# Patient Record
Sex: FEMALE | Race: WHITE | NOT HISPANIC OR LATINO
[De-identification: names, ages, dates, MRNs, and addresses within clinical notes are randomized per-mention and may not be internally consistent; named-entity substitution may affect disease eponyms.]

---

## 2018-08-01 ENCOUNTER — APPOINTMENT (OUTPATIENT)
Dept: PLASTIC SURGERY | Facility: CLINIC | Age: 58
End: 2018-08-01
Payer: SELF-PAY

## 2018-08-01 PROBLEM — Z00.00 ENCOUNTER FOR PREVENTIVE HEALTH EXAMINATION: Status: ACTIVE | Noted: 2018-08-01

## 2018-08-16 ENCOUNTER — APPOINTMENT (OUTPATIENT)
Dept: PLASTIC SURGERY | Facility: CLINIC | Age: 58
End: 2018-08-16
Payer: SELF-PAY

## 2018-08-16 PROCEDURE — 99024 POSTOP FOLLOW-UP VISIT: CPT

## 2019-02-27 ENCOUNTER — APPOINTMENT (OUTPATIENT)
Dept: PLASTIC SURGERY | Facility: CLINIC | Age: 59
End: 2019-02-27
Payer: COMMERCIAL

## 2019-02-27 VITALS
HEIGHT: 64 IN | OXYGEN SATURATION: 99 % | RESPIRATION RATE: 20 BRPM | HEART RATE: 78 BPM | DIASTOLIC BLOOD PRESSURE: 72 MMHG | BODY MASS INDEX: 31.24 KG/M2 | SYSTOLIC BLOOD PRESSURE: 118 MMHG | TEMPERATURE: 98.8 F | WEIGHT: 183 LBS

## 2019-02-27 PROCEDURE — 11422 EXC H-F-NK-SP B9+MARG 1.1-2: CPT

## 2019-02-27 PROCEDURE — 12041 INTMD RPR N-HF/GENIT 2.5CM/<: CPT

## 2019-02-27 NOTE — PROCEDURE
[FreeTextEntry1] : Pigmented lesion left arm  [FreeTextEntry2] : Excisional biopsy left arm 1.4 cm lesion with intermediate closure 2.1 cm wound  [FreeTextEntry3] : Xylocaine 1% with epinephrine  [FreeTextEntry4] : 20 cc  [FreeTextEntry5] : none  [FreeTextEntry6] : The 1.6 cm left arm lesion was marked for excision with the patient in agreement.  Following a sterile prep and drape, Xylocaine with epinephrine was injected for local anesthesia.  Incision was made as marked and the lesion excised.  Hemostasis was assured and flaps advanced and inset with Monocryl and nylon sutures and sterile dressing placed.  Patient tolerated well.   [FreeTextEntry7] : Pigmented lesion left arm

## 2019-02-27 NOTE — ASSESSMENT
[FreeTextEntry1] : A:\par Pigmented lesion left arm \par P:\par Excision of left arm lesion creating 2.1 cm defect\par Intermediate closure of 2.1 cm wound left arm\par Well tolerated \par Instructions reviewed

## 2019-02-27 NOTE — REASON FOR VISIT
[Procedure: _________] : a [unfilled] procedure visit [FreeTextEntry1] : Patient is referred back by her (new) dermatologist, Dr. Altman, for excisional biopsy of a pigmented lesion on the left arm

## 2019-03-06 ENCOUNTER — APPOINTMENT (OUTPATIENT)
Dept: PLASTIC SURGERY | Facility: CLINIC | Age: 59
End: 2019-03-06
Payer: COMMERCIAL

## 2019-03-06 PROCEDURE — 12051 INTMD RPR FACE/MM 2.5 CM/<: CPT | Mod: 78

## 2019-03-06 PROCEDURE — 99024 POSTOP FOLLOW-UP VISIT: CPT

## 2019-03-06 NOTE — REASON FOR VISIT
[Post Op: _________] : a [unfilled] post op visit [FreeTextEntry1] : Patient returns for suture removal, generally doing well with no complaints and no fevers or chills at home.

## 2019-03-06 NOTE — PROCEDURE
[FreeTextEntry1] : Open wound left arm (dehiscence following suture removal) [FreeTextEntry2] : Repair left shoulder wound  [FreeTextEntry3] : Xylocaine 1% with epinephrine  [FreeTextEntry4] : 10 cc [FreeTextEntry5] : none  [FreeTextEntry6] : Following a sterile prep and drape, Xylocaine with epinephrine was injected for local anesthesia.  The wound was washed and then re-approximated with interrupted nylon sutures.  The patient tolerated well . [FreeTextEntry7] : none

## 2019-03-06 NOTE — PHYSICAL EXAM
[de-identified] : Wound clean and well approximated.\par However, after sutures removed wound opened. \par Clean with no surrounding erythema or purulence

## 2019-03-06 NOTE — ASSESSMENT
[FreeTextEntry1] : A:\par Wound dehiscence following suture removal left shoulder\par P:\par Re-approximated \par pathology shows dermatofibroma

## 2019-03-18 ENCOUNTER — APPOINTMENT (OUTPATIENT)
Dept: PLASTIC SURGERY | Facility: CLINIC | Age: 59
End: 2019-03-18
Payer: SELF-PAY

## 2019-03-18 DIAGNOSIS — D48.5 NEOPLASM OF UNCERTAIN BEHAVIOR OF SKIN: ICD-10-CM

## 2019-03-18 PROCEDURE — 15789 CHEMICAL PEEL FACIAL DERMAL: CPT | Mod: 79

## 2019-03-18 NOTE — PHYSICAL EXAM
[de-identified] : wound is clean and well approximated with no erythema or purulence \par There is no palpable collection \par

## 2019-08-20 ENCOUNTER — APPOINTMENT (OUTPATIENT)
Dept: PLASTIC SURGERY | Facility: CLINIC | Age: 59
End: 2019-08-20
Payer: SELF-PAY

## 2019-08-20 PROCEDURE — 15789 CHEMICAL PEEL FACIAL DERMAL: CPT

## 2019-08-22 ENCOUNTER — APPOINTMENT (OUTPATIENT)
Dept: PLASTIC SURGERY | Facility: CLINIC | Age: 59
End: 2019-08-22
Payer: SELF-PAY

## 2019-08-22 PROCEDURE — 11950 SUBQ NJX FILLING MATRL 1CC/<: CPT | Mod: 78

## 2019-08-22 NOTE — REASON FOR VISIT
[Procedure: _________] : a [unfilled] procedure visit [FreeTextEntry1] : patient returns for treatment of prominent Crow's feet lines

## 2019-08-22 NOTE — PROCEDURE
[FreeTextEntry1] : prominent Crow's feet lines  [FreeTextEntry2] : Botox to Crow's feet lines  [FreeTextEntry3] : none  [FreeTextEntry4] : none  [FreeTextEntry5] : none  [FreeTextEntry6] : Patient presenting with prominent Crow's feet lines.  \par Botox 20 units injected.\par Patient tolerated well  [FreeTextEntry7] : none

## 2019-08-22 NOTE — ASSESSMENT
[FreeTextEntry1] : A:\par prominent Crow's feet lines \par P:\par Botox to Crow's feet  \par Tolerated well\par Instructions reviewed

## 2019-09-25 ENCOUNTER — APPOINTMENT (OUTPATIENT)
Dept: PLASTIC SURGERY | Facility: CLINIC | Age: 59
End: 2019-09-25
Payer: SELF-PAY

## 2019-09-25 PROCEDURE — 11950 SUBQ NJX FILLING MATRL 1CC/<: CPT | Mod: 78

## 2019-09-25 NOTE — PROCEDURE
[FreeTextEntry1] : Crow's feet lines and glabellar frown lines  [FreeTextEntry2] : Botox to Crow;s feet and Glabella  [FreeTextEntry3] : Topical Quadricaine  [FreeTextEntry5] : none  [FreeTextEntry4] : minimal  [FreeTextEntry7] : none  [FreeTextEntry6] : Following pre treatment with Quadricaine, Botox injected to touch up Crow's feet lines and treat glabellar frown lines\par Tolerated well \par Instructions reviewed

## 2019-09-25 NOTE — ASSESSMENT
[FreeTextEntry1] : A:\par Crow's feet lines and glabellar frown lines \par P:\par Botox to Crow's feet and glabella \par Tolerated well\par Instructions reviewed

## 2019-09-25 NOTE — REASON FOR VISIT
[Procedure: _________] : a [unfilled] procedure visit [FreeTextEntry1] : Patient presents for Crow's feet touch up and treatment of glabellar frown lines

## 2020-01-07 ENCOUNTER — APPOINTMENT (OUTPATIENT)
Dept: PLASTIC SURGERY | Facility: CLINIC | Age: 60
End: 2020-01-07
Payer: SELF-PAY

## 2020-01-07 PROCEDURE — 15789 CHEMICAL PEEL FACIAL DERMAL: CPT | Mod: 78

## 2021-03-11 ENCOUNTER — APPOINTMENT (OUTPATIENT)
Dept: PLASTIC SURGERY | Facility: CLINIC | Age: 61
End: 2021-03-11
Payer: SELF-PAY

## 2021-03-11 PROCEDURE — 11950 SUBQ NJX FILLING MATRL 1CC/<: CPT

## 2021-03-11 NOTE — REASON FOR VISIT
[Procedure: _________] : a [unfilled] procedure visit [FreeTextEntry1] : Patient presents with facial rhytids for treatment with Botox

## 2021-03-11 NOTE — ASSESSMENT
[FreeTextEntry1] : A:\par Facial rhytids\par P:\par Botox to forehead, glabella, and Crow's feet\par Tolerated well\par Instructions reviewed

## 2021-03-11 NOTE — PROCEDURE
[FreeTextEntry1] : Facial rhytids  [FreeTextEntry2] : Botox to forehead, glabella and Crow's feet  [FreeTextEntry3] : Topical Quadricaine [FreeTextEntry4] : minimal  [FreeTextEntry5] : none  [FreeTextEntry6] : FOllowing pre treatment with Quadricaine, 20 units Botox injected in the glabella and Crow's feet lines and 10 units in the forehead\par Patient tolerated well  [FreeTextEntry7] : none

## 2021-05-27 ENCOUNTER — APPOINTMENT (OUTPATIENT)
Dept: PLASTIC SURGERY | Facility: CLINIC | Age: 61
End: 2021-05-27
Payer: SELF-PAY

## 2021-05-27 PROCEDURE — 11950 SUBQ NJX FILLING MATRL 1CC/<: CPT

## 2021-05-27 NOTE — REASON FOR VISIT
[Procedure: _________] : a [unfilled] procedure visit [FreeTextEntry1] : Patient with angella orbital rhytids returns for treatment with Botox

## 2021-05-27 NOTE — ASSESSMENT
[FreeTextEntry1] : A:\par Yvette orbital rhytids\par P:\par Botox to forehead, glabella, and Crow's feet\par tolerated well\par instructions reviewed

## 2021-05-27 NOTE — PROCEDURE
[FreeTextEntry1] : Yvette orbital rhytids [FreeTextEntry2] : Botox to forehead, glabella and Crow's feet lines  [FreeTextEntry4] : minimal  [FreeTextEntry3] : Topical Quadricaine  [FreeTextEntry5] : none  [FreeTextEntry6] : Following pre treatment with Quadricaine, 20 units Botox injected in the forehead, 20 in the Crow's feet, and 10 in the glabella.  Patient has brow asymmetry pre treatment with left more active than the right \par Tolerated well  [FreeTextEntry7] : none

## 2022-03-30 ENCOUNTER — APPOINTMENT (OUTPATIENT)
Dept: PLASTIC SURGERY | Facility: CLINIC | Age: 62
End: 2022-03-30
Payer: SELF-PAY

## 2022-03-30 PROCEDURE — 64612 DESTROY NERVE FACE MUSCLE: CPT

## 2022-03-30 NOTE — PROCEDURE
[FreeTextEntry1] : Yvette orbital rhytids  [FreeTextEntry2] : Botox to forehead, glabella and Crow's feet rhytids  [FreeTextEntry3] : Topical  [FreeTextEntry4] : minimal  [FreeTextEntry5] : none  [FreeTextEntry6] : Following pre treatment with Quadricaine, Botox injected in angella orbital rhytids\par Patient tolerated well  [FreeTextEntry7] : none

## 2022-03-30 NOTE — ASSESSMENT
[FreeTextEntry1] : A:\par Yvette orbital rhytids\par P:\par Botox to forehead (10) glabella (15), and Crow's feet 915).\par Tolerated well \par Instructions reviewed

## 2022-03-30 NOTE — REASON FOR VISIT
[Procedure: _________] : a [unfilled] procedure visit [FreeTextEntry1] : Patient returns for treatment of angella orbital rhytids with Botox

## 2023-08-04 ENCOUNTER — APPOINTMENT (OUTPATIENT)
Dept: PLASTIC SURGERY | Facility: CLINIC | Age: 63
End: 2023-08-04
Payer: COMMERCIAL

## 2023-08-04 ENCOUNTER — RESULT REVIEW (OUTPATIENT)
Age: 63
End: 2023-08-04

## 2023-08-04 VITALS
OXYGEN SATURATION: 99 % | RESPIRATION RATE: 22 BRPM | DIASTOLIC BLOOD PRESSURE: 81 MMHG | HEART RATE: 70 BPM | SYSTOLIC BLOOD PRESSURE: 128 MMHG | TEMPERATURE: 98.5 F

## 2023-08-04 DIAGNOSIS — L72.0 EPIDERMAL CYST: ICD-10-CM

## 2023-08-04 PROCEDURE — 11441 EXC FACE-MM B9+MARG 0.6-1 CM: CPT

## 2023-08-04 NOTE — ASSESSMENT
[FreeTextEntry1] : A: Cystic mass left neck  P: Excisional biopsy of left neck cyst Tolerated well Instructions reviewed

## 2023-08-04 NOTE — REASON FOR VISIT
[Procedure: _________] : a [unfilled] procedure visit [FreeTextEntry1] : Patient presents for excisional biopsy of a cystic lesion on the neck

## 2023-08-04 NOTE — PROCEDURE
[FreeTextEntry1] : cystic lesion left neck  [FreeTextEntry2] : Excisional biopsy of a cystic mass on the neck  [FreeTextEntry3] : Xylocaine with epinephrine  [FreeTextEntry4] : minimal  [FreeTextEntry5] : none  [FreeTextEntry6] : The cystic mass was marked for excision with the patient in agreement.  Following a sterile perp and drape, Xylocaine with epinephrine was injected for local anesthesia.  Incision was made as marked and the lesion was excised.  Hemostasis was assured and incision closed with nylon suture.  Patient tolerated well  [FreeTextEntry7] : Cystic mass left neck

## 2023-08-10 ENCOUNTER — APPOINTMENT (OUTPATIENT)
Dept: PLASTIC SURGERY | Facility: CLINIC | Age: 63
End: 2023-08-10

## 2023-08-10 VITALS
HEART RATE: 70 BPM | DIASTOLIC BLOOD PRESSURE: 81 MMHG | TEMPERATURE: 98.6 F | OXYGEN SATURATION: 95 % | SYSTOLIC BLOOD PRESSURE: 127 MMHG

## 2023-11-09 ENCOUNTER — APPOINTMENT (OUTPATIENT)
Dept: PLASTIC SURGERY | Facility: CLINIC | Age: 63
End: 2023-11-09
Payer: SELF-PAY

## 2023-11-09 PROCEDURE — 64612 DESTROY NERVE FACE MUSCLE: CPT

## 2024-03-21 ENCOUNTER — APPOINTMENT (OUTPATIENT)
Dept: PLASTIC SURGERY | Facility: CLINIC | Age: 64
End: 2024-03-21

## 2024-06-26 ENCOUNTER — APPOINTMENT (OUTPATIENT)
Dept: PLASTIC SURGERY | Facility: CLINIC | Age: 64
End: 2024-06-26
Payer: SELF-PAY

## 2024-06-26 DIAGNOSIS — Z41.1 ENCOUNTER FOR COSMETIC SURGERY: ICD-10-CM

## 2024-06-26 PROCEDURE — 64612 DESTROY NERVE FACE MUSCLE: CPT

## 2024-07-31 ENCOUNTER — APPOINTMENT (OUTPATIENT)
Dept: PLASTIC SURGERY | Facility: CLINIC | Age: 64
End: 2024-07-31
Payer: SELF-PAY

## 2024-07-31 DIAGNOSIS — Z41.1 ENCOUNTER FOR COSMETIC SURGERY: ICD-10-CM

## 2024-07-31 NOTE — PROCEDURE
[FreeTextEntry1] : Crow's feet lines, residual  [FreeTextEntry2] : Botox to Crow's feet, additional  [FreeTextEntry3] : Topical Quadricaine  [FreeTextEntry4] : minimal  [FreeTextEntry5] : none  [FreeTextEntry6] : Following pretreatment with Quadricaine, 10 additional units injected in Crow's feet lines. Patient tolerated well.   [FreeTextEntry7] : none

## 2024-07-31 NOTE — REASON FOR VISIT
[Procedure: _________] : a [unfilled] procedure visit [FreeTextEntry1] : Patient returns for touch up Botox to Crow's feet

## 2024-07-31 NOTE — ASSESSMENT
[FreeTextEntry1] : A: Residual Crow's feet lines P: Botox 10 additional units to touch up Crow's feet- tolerated well.

## 2025-02-19 ENCOUNTER — APPOINTMENT (OUTPATIENT)
Dept: PLASTIC SURGERY | Facility: CLINIC | Age: 65
End: 2025-02-19
Payer: SELF-PAY

## 2025-02-19 DIAGNOSIS — Z41.1 ENCOUNTER FOR COSMETIC SURGERY: ICD-10-CM

## 2025-02-19 PROCEDURE — 64612 DESTROY NERVE FACE MUSCLE: CPT
